# Patient Record
Sex: MALE | Race: WHITE
[De-identification: names, ages, dates, MRNs, and addresses within clinical notes are randomized per-mention and may not be internally consistent; named-entity substitution may affect disease eponyms.]

---

## 2019-06-05 ENCOUNTER — HOSPITAL ENCOUNTER (OUTPATIENT)
Dept: HOSPITAL 74 - JASU-SURG | Age: 39
Discharge: HOME | End: 2019-06-05
Attending: ORTHOPAEDIC SURGERY
Payer: COMMERCIAL

## 2019-06-05 VITALS — HEART RATE: 74 BPM | SYSTOLIC BLOOD PRESSURE: 143 MMHG | DIASTOLIC BLOOD PRESSURE: 80 MMHG

## 2019-06-05 VITALS — BODY MASS INDEX: 26.9 KG/M2

## 2019-06-05 VITALS — TEMPERATURE: 97.5 F

## 2019-06-05 DIAGNOSIS — M23.92: Primary | ICD-10-CM

## 2019-06-05 PROCEDURE — 0SBD4ZZ EXCISION OF LEFT KNEE JOINT, PERCUTANEOUS ENDOSCOPIC APPROACH: ICD-10-PCS | Performed by: ORTHOPAEDIC SURGERY

## 2019-06-05 NOTE — HP
Satellite [facility]





- Chief Complaint


Chief Complaint: left knee pain





- Past Medical History


Allergies/Adverse Reactions: 


 Allergies











Allergy/AdvReac Type Severity Reaction Status Date / Time


 


No Known Allergies Allergy   Verified 06/05/19 09:50














- Current Medications


Current Medications: 


 Home Medications











 Medication  Instructions  Recorded


 


Finasteride [Proscar -] 5 mg PO DAILY 05/21/19


 


Oxycodone HCl/Acetaminophen 1 tab PO Q6H #20 tablet MDD 4 06/05/19





[Percocet 5-325 mg Tablet]  














Satellite Physical Exam





- Physical Examination


Vital Signs: 


 Vital Signs











 Period  Temp  Pulse  Resp  BP Sys/Nicolas  Pulse Ox


 


 Last 24 Hr  68 F  68  20  141/91  99











General Appearance: Well Nourished, Well Developed, Alert & Oriented x3


ENT: Clear


Lung: Normal air movement


Heart: Regular rate & rhythm


Extremities: Other (left knee- + swelling, + ttp, decr rom, + m cmurrays, nvi, 

MRI +mt)


Neurological: Intact, Alert, Oriented





Satellite Impression/Plan





- Impression/Plan


Impression: left knee internal derangement


Operative Procedure: left knee arthroscopy


Date to be Performed: 06/05/19

## 2019-06-05 NOTE — OP
Operative Note





- Note:


Operative Date: 06/05/19 ([facility])


Pre-Operative Diagnosis: left knee internal derangement


Operation: left knee arthroscopy with PMM


Post-Operative Diagnosis: Same as Pre-op


Surgeon: Alin Rivers


Assistant: Curtis Slater


Anesthesia: General, Local


Specimens Removed: shavings


Estimated Blood Loss (mls): 5


Operative Report Dictated: Yes

## 2019-06-06 NOTE — PATH
Surgical Pathology Report



Patient Name:  LOPEZ TURNER

Accession #:  [id number]

Med. Rec. #:  [id number]                                                        

   /Age/Gender:  1980 (Age: 39) / M

Account:  [id number]                                                          

             Location: [facility]

Taken:  2019

Received:  2019

Reported:  2019

Physicians:  Curtis Slater M.D.

  



Specimen(s) Received

 LEFT KNEE SHAVINGS 





Clinical History

Tear left knee







Final Diagnosis

LEFT KNEE SHAVINGS:

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

ACUTE INFLAMMATORY INFILTRATE AND REACTIVE SYNOVIAL HYPERPLASIA.



Comments: Although these findings are nonspecific, dense lymphoplasmacytic

infiltrate involving synovium with reactive synovial hyperplasia may be seen in

association with rheumatoid arthritis. Correlation with clinical/ radiologic and

serologic findings is suggested.





***Electronically Signed***

Viri Riley M.D.





Gross Description

Received in formalin, labeled "left knee shavings," is a 5.5 x 4.0 x 0.5 cm.

aggregate of tan-yellow soft tissue fragments. A representative portion is

submitted in one cassette.

/2019



saudi

## 2019-06-07 NOTE — OP
DATE OF OPERATION:  06/05/2019

 

PREOPERATIVE DIAGNOSIS:  Internal derangement of the left knee.

 

POSTOPERATIVE DIAGNOSIS:  Internal derangement of the left knee.

 

PROCEDURE:  Arthroscopy with partial medial meniscectomy.

 

SURGICAL ATTENDING:  Alin Rivers MD

 

FIRST ASSISTANT:  Curtis Slater MD

 

ANESTHESIA:  General with LMA.

 

CLOSURE:  4-0 nylon.

 

COMPLICATIONS:  None.

 

CONDITION:  To recovery room in stable condition.

 

DESCRIPTION OF OPERATIVE PROCEDURE:  Patient was taken to the operating room on June 5, 2019.  General anesthesia with LMA was administered by the anesthesiologist.  The

left lower extremity was prepped and draped in the usual sterile fashion.  The medial

and lateral and infrapatellar portal sites were infiltrated with 1% Xylocaine with

epinephrine.  Both portals were then made with a 15 blade followed by blunt trocar. 

Scope was placed through the inferolateral portal up into the suprapatellar pouch. 

The knee was inflated with a cocktail of 10 mL of 1% Xylocaine and 10 mL of 0.5%

Marcaine and 20 mL of arthroscopic saline.  After allowing the anesthetic to work

inside the knee, the procedure was performed.  The medial and lateral gutters were

visualized to be clean.  The undersurface of the patella and trochlea were visualized

to be intact.  With valgus stress on the knee, the medial compartment was entered. 

The medial meniscus was found to have a large flap tear of its posterior horn.  This

was debrided back to a smooth and stable meniscal tissue using a meniscal biter and

arthroscopic shaver.  The medial femoral condyle was run and found to be intact, as

was the medial tibial plateau.  At 90 degrees, the ACL was visualized and probed,

found to be intact.  In the figure-of-four position, the lateral compartment was

entered.  Lateral meniscus was visualized and probed, found to be intact.  The

lateral femoral condyle was run and found to be intact, as was the lateral tibial

plateau.  The knee was irrigated with copious amounts of irrigation.  The portals

were closed using 4-0 nylon.  Sterile pressure dressing was placed over the knee. 

Prior to pulling the scope trocar, 20 mL of 0.5% Marcaine was infused through the

trocar for postoperative analgesia.  Sterile pressure dressing was placed over the

knee.  Patient awakened from anesthesia and transferred to recovery room in stable

condition.  No complications.  Estimated blood loss negligible. 

 

 

 

AMOL [id number]

DD: 06/05/2019 12:34

DT: 06/05/2019 21:38

Job #:  [id number]

## 2023-05-31 ENCOUNTER — HOSPITAL ENCOUNTER (OUTPATIENT)
Dept: HOSPITAL 74 - JASU-SURG | Age: 43
Discharge: HOME | End: 2023-05-31
Attending: ORTHOPAEDIC SURGERY
Payer: COMMERCIAL

## 2023-05-31 VITALS — RESPIRATION RATE: 20 BRPM | TEMPERATURE: 97.7 F

## 2023-05-31 VITALS — DIASTOLIC BLOOD PRESSURE: 89 MMHG | SYSTOLIC BLOOD PRESSURE: 131 MMHG | HEART RATE: 60 BPM

## 2023-05-31 VITALS — BODY MASS INDEX: 27.2 KG/M2

## 2023-05-31 DIAGNOSIS — M23.91: Primary | ICD-10-CM

## 2023-05-31 PROCEDURE — 0SBC4ZZ EXCISION OF RIGHT KNEE JOINT, PERCUTANEOUS ENDOSCOPIC APPROACH: ICD-10-PCS | Performed by: ORTHOPAEDIC SURGERY
